# Patient Record
Sex: FEMALE | Race: ASIAN
[De-identification: names, ages, dates, MRNs, and addresses within clinical notes are randomized per-mention and may not be internally consistent; named-entity substitution may affect disease eponyms.]

---

## 2017-07-29 ENCOUNTER — HOSPITAL ENCOUNTER (EMERGENCY)
Dept: HOSPITAL 45 - C.EDB | Age: 22
Discharge: HOME | End: 2017-07-29
Payer: SELF-PAY

## 2017-07-29 VITALS
BODY MASS INDEX: 19.38 KG/M2 | WEIGHT: 109.35 LBS | HEIGHT: 62.99 IN | WEIGHT: 109.35 LBS | HEIGHT: 62.99 IN | BODY MASS INDEX: 19.38 KG/M2

## 2017-07-29 VITALS — OXYGEN SATURATION: 98 % | SYSTOLIC BLOOD PRESSURE: 106 MMHG | DIASTOLIC BLOOD PRESSURE: 70 MMHG | HEART RATE: 65 BPM

## 2017-07-29 VITALS — TEMPERATURE: 98.42 F

## 2017-07-29 DIAGNOSIS — O20.0: Primary | ICD-10-CM

## 2017-07-29 DIAGNOSIS — Z3A.00: ICD-10-CM

## 2017-07-29 DIAGNOSIS — O23.41: ICD-10-CM

## 2017-07-29 DIAGNOSIS — R10.2: ICD-10-CM

## 2017-07-29 LAB
ALBUMIN/GLOB SERPL: 1.1 {RATIO} (ref 0.9–2)
ALP SERPL-CCNC: 56 U/L (ref 45–117)
ALT SERPL-CCNC: 19 U/L (ref 12–78)
ANION GAP SERPL CALC-SCNC: 6 MMOL/L (ref 3–11)
APPEARANCE UR: CLEAR
AST SERPL-CCNC: 6 U/L (ref 15–37)
BASOPHILS # BLD: 0.02 K/UL (ref 0–0.2)
BASOPHILS NFR BLD: 0.2 %
BILIRUB UR-MCNC: (no result) MG/DL
BUN SERPL-MCNC: 10 MG/DL (ref 7–18)
BUN/CREAT SERPL: 18.1 (ref 10–20)
CALCIUM SERPL-MCNC: 8.5 MG/DL (ref 8.5–10.1)
CHLORIDE SERPL-SCNC: 107 MMOL/L (ref 98–107)
CO2 SERPL-SCNC: 25 MMOL/L (ref 21–32)
COLOR UR: YELLOW
COMPLETE: YES
CREAT CL PREDICTED SERPL C-G-VRATE: 123.5 ML/MIN
CREAT SERPL-MCNC: 0.55 MG/DL (ref 0.6–1.2)
EOSINOPHIL NFR BLD AUTO: 347 K/UL (ref 130–400)
GLOBULIN SER-MCNC: 3.5 GM/DL (ref 2.5–4)
GLUCOSE SERPL-MCNC: 65 MG/DL (ref 70–99)
HCT VFR BLD CALC: 37.6 % (ref 37–47)
IG%: 0.1 %
IMM GRANULOCYTES NFR BLD AUTO: 28.2 %
INR PPP: 1 (ref 0.9–1.1)
LYMPHOCYTES # BLD: 2.59 K/UL (ref 1.2–3.4)
MANUAL MICROSCOPIC REQUIRED?: NO
MCH RBC QN AUTO: 28.5 PG (ref 25–34)
MCHC RBC AUTO-ENTMCNC: 33.2 G/DL (ref 32–36)
MCV RBC AUTO: 85.8 FL (ref 80–100)
MONOCYTES NFR BLD: 9.4 %
NEUTROPHILS # BLD AUTO: 1.5 %
NEUTROPHILS NFR BLD AUTO: 60.6 %
NITRITE UR QL STRIP: (no result)
PARTIAL THROMBOPLASTIN RATIO: 1
PH UR STRIP: 6 [PH] (ref 4.5–7.5)
PMV BLD AUTO: 9.1 FL (ref 7.4–10.4)
POTASSIUM SERPL-SCNC: 3.4 MMOL/L (ref 3.5–5.1)
PROTHROMBIN TIME: 10.4 SECONDS (ref 9–12)
RBC # BLD AUTO: 4.38 M/UL (ref 4.2–5.4)
REVIEW REQ?: YES
SODIUM SERPL-SCNC: 138 MMOL/L (ref 136–145)
SP GR UR STRIP: 1.03 (ref 1–1.03)
URINE BILL WITH OR WITHOUT MIC: (no result)
URINE EPITHELIAL CELL AUTO: >30 /LPF (ref 0–5)
UROBILINOGEN UR-MCNC: (no result) MG/DL
WBC # BLD AUTO: 9.19 K/UL (ref 4.8–10.8)

## 2017-07-29 NOTE — DIAGNOSTIC IMAGING REPORT
APPENDIX ULTRASOUND



HISTORY:     Right lower quadrant pain. Pregnancy.



COMPARISON: None.



FINDINGS: 

Transabdominal scanning of the right lower quadrant was performed. The appendix

was not identified. A small amount of fluid was noted within the right lower

quadrant.



IMPRESSION:  







1. Nonvisualization of the appendix. This study is nondiagnostic in regards to

evaluation for acute appendicitis.



2. Small amount of fluid within the right lower quadrant.







Electronically signed by:  Edgar Segura M.D.

7/29/2017 7:25 PM



Dictated Date/Time:  7/29/2017 7:25 PM

## 2017-07-29 NOTE — DIAGNOSTIC IMAGING REPORT
FIRST TRIMESTER FETAL ULTRASOUND



CLINICAL HISTORY: Pregnancy. Pain.    



COMPARISON STUDY:  No previous studies for comparison.



TECHNIQUE: Transabdominal sonography of the pelvis was performed. The patient

deferred transvaginal imaging.



FINDINGS: Evaluation is suboptimal given the lack of transvaginal imaging. The

uterus measures 10.6 x 6.3 x 6.8 cm. The endometrium is thickened and

heterogeneous, measuring 3.5 cm in thickness. Note was made of a cystic

structure within the upper aspect of the endometrium with a mean diameter of

0.51 cm which likely reflects a gestational sac. No fetal pole or yolk sac was

identified. An adjacent 0.81 cm hypoechoic focus may reflect a tiny subchorionic

hematoma. The right ovary was normal, measuring 3.1 x 1 x 2.7 cm and the left

ovary measured 4.2 x 3.7 x 2.9 cm. There is a 2.2 cm complex left ovarian lesion

which may reflect a corpus luteal cyst. Color flow is identified within each

ovary.



IMPRESSION:  



1.  Probable intrauterine gestational sac identified with a mean sac diameter of

0.51 cm. No fetal pole identified at this time. Suspected tiny subchorionic

hematoma. Thickened, heterogeneous endometrium. The findings may reflect a

normal early intrauterine gestation. Clinical follow-up, including serial beta

hCG levels, is recommended.



2. Suboptimal evaluation given the lack of transvaginal imaging.



3. Corpus luteal/hemorrhagic cyst within the left ovary.









Electronically signed by:  Edgar Segura M.D.

7/29/2017 7:24 PM



Dictated Date/Time:  7/29/2017 7:19 PM

## 2017-07-29 NOTE — EMERGENCY ROOM VISIT NOTE
History


Report prepared by Gloria:  Oxana Beltran


Under the Supervision of:  Dr. Yang Johnson D.O.


First contact with patient:  18:02


Chief Complaint:  ABDOMINAL PAIN


Stated Complaint:  PAIN UNDER BELLY BUTTON


Nursing Triage Summary:  


Abd pain.  Pt states she had a positive pregnancy test.  Pain was there in the 


morning, none now.





History of Present Illness


The patient is a 22 year old female who presents to the Emergency Room with 

complaints of intermittent abdominal pain for the past 4 days. The pain is 

present under her belly button. She had a positive pregnancy test 2 days ago. 

She has been pregnant 1 time before and had a full term baby. She did not have 

this type of pain with her first pregnancy. She states that the pain feels like 

her premenstrual cramps. Her last period was 1 month ago. She denies having any 

pain currently. She has had some dizziness and lightheadedness when she stands 

up sometimes. She denies any urinary symptoms, vaginal bleeding, back pain, or 

fever. She denies any previous medical problems or surgeries. She denies any 

tobacco or alcohol use.





   Source of History:  patient


   Onset:  4 days


   Position:  abdomen


   Quality:  cramping


   Timing:  intermittent


   Associated Symptoms:  No fevers, No back pain, No urinary symptoms


Note:


Pt has lightheadedness and dizziness when standing. Pt denies vaginal bleeding.





Review of Systems


See HPI for pertinent positives & negatives. A total of 10 systems reviewed and 

were otherwise negative.





Past Medical & Surgical


Surgical Problems:


(1) Hx of LASIK








Family History


No pertinent family history stated.





Social History


Smoking Status:  Never Smoker


Housing Status:  lives with family





Current/Historical Medications


Scheduled


Cephalexin Monohydrate (Keflex), 500 MG PO QID


Multivitamin (Multivitamin), 1 TAB PO DAILY





Allergies


Coded Allergies:  


     No Known Allergies (Unverified , 7/29/17)





Physical Exam


Vital Signs











  Date Time  Temp Pulse Resp B/P (MAP) Pulse Ox O2 Delivery O2 Flow Rate FiO2


 


7/29/17 21:02  65 18 106/70 98   


 


7/29/17 19:34  66 16 102/56 98 Room Air  


 


7/29/17 17:34 36.9 102 18 107/69 100 Room Air  











Physical Exam


GENERAL:  Patient is awake, alert, and in no acute distress. Patient is resting 

comfortably and showing no signs of anxiety


EYES: The conjunctivae are clear.  The pupils are round and reactive. 


EARS, NOSE, MOUTH AND THROAT: The nose is without any evidence of any 

deformity. Mucous membranes are moist tongue is midline 


NECK: The neck is nontender and supple.


RESPIRATORY: Normal respiratory effort is noted there is no evidence of 

wheezing rhonchi or rales


CARDIOVASCULAR:  Regular rate and rhythm noted there no murmurs rubs or gallops 

normal S1 normal S2 


GASTROINTESTINAL: The abdomen is soft with RLQ and suprapubic tenderness to 

palpation, no guarding or rigidity. Bedside US failed to reveal definite 

intrauterine pregnancy.


MUSCULOSKELETAL/EXTREMITIES: There is no evidence of gross deformity full range 

of motion is noted in the hips and shoulders


SKIN: There is no obvious evidence of any rash. There are no petechiae, pallor 

or cyanosis noted. 


NEUROLOGIC:  Patient is awake alert and oriented x3





Medical Decision & Procedures


ER Provider


Diagnostic Interpretation:


Radiology results as stated below per my review and radiologist interpretation:





APPENDIX ULTRASOUND





HISTORY:     Right lower quadrant pain. Pregnancy.





COMPARISON: None.





FINDINGS: 


Transabdominal scanning of the right lower quadrant was performed. The appendix


was not identified. A small amount of fluid was noted within the right lower


quadrant.





IMPRESSION:  





1. Nonvisualization of the appendix. This study is nondiagnostic in regards to


evaluation for acute appendicitis.





2. Small amount of fluid within the right lower quadrant.





Electronically signed by:  Edgar Segura M.D.


7/29/2017 7:25 PM





Dictated Date/Time:  7/29/2017 7:25 PM








FIRST TRIMESTER FETAL ULTRASOUND





CLINICAL HISTORY: Pregnancy. Pain.    





COMPARISON STUDY:  No previous studies for comparison.





TECHNIQUE: Transabdominal sonography of the pelvis was performed. The patient


deferred transvaginal imaging.





FINDINGS: Evaluation is suboptimal given the lack of transvaginal imaging. The


uterus measures 10.6 x 6.3 x 6.8 cm. The endometrium is thickened and


heterogeneous, measuring 3.5 cm in thickness. Note was made of a cystic


structure within the upper aspect of the endometrium with a mean diameter of


0.51 cm which likely reflects a gestational sac. No fetal pole or yolk sac was


identified. An adjacent 0.81 cm hypoechoic focus may reflect a tiny subchorionic


hematoma. The right ovary was normal, measuring 3.1 x 1 x 2.7 cm and the left


ovary measured 4.2 x 3.7 x 2.9 cm. There is a 2.2 cm complex left ovarian lesion


which may reflect a corpus luteal cyst. Color flow is identified within each


ovary.





IMPRESSION:  





1.  Probable intrauterine gestational sac identified with a mean sac diameter of


0.51 cm. No fetal pole identified at this time. Suspected tiny subchorionic


hematoma. Thickened, heterogeneous endometrium. The findings may reflect a


normal early intrauterine gestation. Clinical follow-up, including serial beta


hCG levels, is recommended.





2. Suboptimal evaluation given the lack of transvaginal imaging.





3. Corpus luteal/hemorrhagic cyst within the left ovary.





Electronically signed by:  Edgar Segura M.D.


7/29/2017 7:24 PM





Dictated Date/Time:  7/29/2017 7:19 PM





Laboratory Results


7/29/17 18:20








Red Blood Count 4.38, Mean Corpuscular Volume 85.8, Mean Corpuscular Hemoglobin 

28.5, Mean Corpuscular Hemoglobin Concent 33.2, Mean Platelet Volume 9.1, 

Neutrophils (%) (Auto) 60.6, Lymphocytes (%) (Auto) 28.2, Monocytes (%) (Auto) 

9.4, Eosinophils (%) (Auto) 1.5, Basophils (%) (Auto) 0.2, Neutrophils # (Auto) 

5.57, Lymphocytes # (Auto) 2.59, Monocytes # (Auto) 0.86, Eosinophils # (Auto) 

0.14, Basophils # (Auto) 0.02





7/29/17 18:20

















Test


  7/29/17


18:20


 


White Blood Count


  9.19 K/uL


(4.8-10.8)


 


Red Blood Count


  4.38 M/uL


(4.2-5.4)


 


Hemoglobin


  12.5 g/dL


(12.0-16.0)


 


Hematocrit 37.6 % (37-47) 


 


Mean Corpuscular Volume


  85.8 fL


()


 


Mean Corpuscular Hemoglobin


  28.5 pg


(25-34)


 


Mean Corpuscular Hemoglobin


Concent 33.2 g/dl


(32-36)


 


Platelet Count


  347 K/uL


(130-400)


 


Mean Platelet Volume


  9.1 fL


(7.4-10.4)


 


Neutrophils (%) (Auto) 60.6 % 


 


Lymphocytes (%) (Auto) 28.2 % 


 


Monocytes (%) (Auto) 9.4 % 


 


Eosinophils (%) (Auto) 1.5 % 


 


Basophils (%) (Auto) 0.2 % 


 


Neutrophils # (Auto)


  5.57 K/uL


(1.4-6.5)


 


Lymphocytes # (Auto)


  2.59 K/uL


(1.2-3.4)


 


Monocytes # (Auto)


  0.86 K/uL


(0.11-0.59)


 


Eosinophils # (Auto)


  0.14 K/uL


(0-0.5)


 


Basophils # (Auto)


  0.02 K/uL


(0-0.2)


 


RDW Standard Deviation


  42.9 fL


(36.4-46.3)


 


RDW Coefficient of Variation


  13.6 %


(11.5-14.5)


 


Immature Granulocyte % (Auto) 0.1 % 


 


Immature Granulocyte # (Auto)


  0.01 K/uL


(0.00-0.02)


 


Prothrombin Time


  10.4 SECONDS


(9.0-12.0)


 


Prothromb Time International


Ratio 1.0 (0.9-1.1) 


 


 


Activated Partial


Thromboplast Time 26.3 SECONDS


(21.0-31.0)


 


Partial Thromboplastin Ratio 1.0 


 


Urine Color YELLOW 


 


Urine Appearance CLEAR (CLEAR) 


 


Urine pH 6.0 (4.5-7.5) 


 


Urine Specific Gravity


  1.026


(1.000-1.030)


 


Urine Protein NEG (NEG) 


 


Urine Glucose (UA) NEG (NEG) 


 


Urine Ketones NEG (NEG) 


 


Urine Occult Blood NEG (NEG) 


 


Urine Nitrite NEG (NEG) 


 


Urine Bilirubin NEG (NEG) 


 


Urine Urobilinogen NEG (NEG) 


 


Urine Leukocyte Esterase SMALL (NEG) 


 


Urine WBC (Auto) >30 /hpf (0-5) 


 


Urine RBC (Auto) 0-4 /hpf (0-4) 


 


Urine Hyaline Casts (Auto)


  5-10 /lpf


(0-5)


 


Urine Epithelial Cells (Auto) >30 /lpf (0-5) 


 


Urine Bacteria (Auto) 1+ (NEG) 


 


Urine Pathogenic Casts  /lpf (0) 


 


Anion Gap


  6.0 mmol/L


(3-11)


 


Est Creatinine Clear Calc


Drug Dose 123.5 ml/min 


 


 


Estimated GFR (


American) > 150.0 


 


 


Estimated GFR (Non-


American 131.3 


 


 


BUN/Creatinine Ratio 18.1 (10-20) 


 


Calcium Level


  8.5 mg/dl


(8.5-10.1)


 


Total Bilirubin


  0.2 mg/dl


(0.2-1)


 


Aspartate Amino Transf


(AST/SGOT) 6 U/L (15-37) 


 


 


Alanine Aminotransferase


(ALT/SGPT) 19 U/L (12-78) 


 


 


Alkaline Phosphatase


  56 U/L


()


 


Total Protein


  7.2 gm/dl


(6.4-8.2)


 


Albumin


  3.7 gm/dl


(3.4-5.0)


 


Globulin


  3.5 gm/dl


(2.5-4.0)


 


Albumin/Globulin Ratio 1.1 (0.9-2) 


 


Human Chorionic Gonadotropin,


Quant 3247 mIU/mL 


 





Laboratory results per my review.





Medications Administered











 Medications


  (Trade)  Dose


 Ordered  Sig/Amber


 Route  Start Time


 Stop Time Status Last Admin


Dose Admin


 


 Sodium Chloride  1,000 ml @ 


 999 mls/hr  Q1H1M STAT


 IV  7/29/17 18:13


 7/29/17 19:13 DC 7/29/17 18:13


999 MLS/HR


 


 Ceftriaxone Sodium


  (Rocephin Inj)  1 gm  NOW  STAT


 IV  7/29/17 20:03


 7/29/17 20:04 DC 7/29/17 20:17


1 GM











ED Course


1806: The patient was evaluated in room A11B. A complete history and physical 

examination were performed. 





1813: NSS 1000 ml @ 999 mls/hr IV.





2002: Upon reevaluation, the patient is resting comfortably. I discussed the 

results and treatment plan with her. She verbalized agreement of the treatment 

plan. She was discharged home. 





2003: Rocephin Inj 1 gm IV. 





2008: I discussed the patient's case with Dr. Hunter, [facility]. She 

recommends follow up in 48 hours and return for worsening symptoms.





Medical Decision


Prior records/ancillary studies reviewed.


Triage Nursing notes reviewed.


The patient's history was concerning for abdominal pain. 





Differential diagnosis:


Etiologies such as appendicitis, diverticulitis, PUD, biliary pathology, UTI, 

pancreatitis, obstruction, mesenteric ischemia, aortic pathology, infections, 

inflammatory bowel disease, renal colic, as well as others were entertained. 





The patient is a 22-year-old female who presented to the emergency department 

for evaluation of pelvic pain. The patient briefly found out she was pregnant 

by a urinary pregnancy test. The patient also had right-sided abdominal pain 

but her physical exam was not consistent with an acute surgical abdomen and her 

white blood cell count was not elevated. I discussed the patient's laboratory 

and radiographic studies with her. I discussed her case with the on-call OB/GYN 

physician. At this time I recommended 48 hour follow-up for repeat ultrasound 

as well as beta hCG. I also recommended that she return to the emergency 

Department immediately if any signs that would be consistent with ectopic 

pregnancy develop such as worsening pain syncope dizziness or if need arises. 

The patient was also evaluated by the emergency department .





Medication Reconcilliation


Current Medication List:  was personally reviewed by me





Blood Pressure Screening


Patient's blood pressure:  Normal blood pressure


Blood pressure disposition:  Did not require urgent referral





Consults


Time Called:  2003


Consulting Physician:  Dr. Hunter, [facility]


Returned Call:  2008


I discussed the patient's case with her. She recommends follow up in 48 hours 

and return for worsening symptoms.





Impression





 Primary Impression:  


 Pelvic pain


 Additional Impressions:  


 Pregnancy


 UTI (urinary tract infection)


 Threatened miscarriage in early pregnancy





Scribe Attestation


The scribe's documentation has been prepared under my direction and personally 

reviewed by me in its entirety. I confirm that the note above accurately 

reflects all work, treatment, procedures, and medical decision making performed 

by me.





Departure Information


Dispostion


Home / Self-Care





Prescriptions





Cephalexin Monohydrate (KEFLEX) 500 Mg Cap


500 MG PO QID, #28 CAP


   Prov: Yang Johsnon, DO         7/29/17





Referrals


No Doctor, Assigned (PCP)








Dorinda Hunter MD





Forms


HOME CARE DOCUMENTATION FORM,                                                 

               IMPORTANT VISIT INFORMATION





Patient Instructions


ED Abdominal Pain Rule Out Ectopic, My [facility], Urinary Tract 

Infecs Women





Additional Instructions





Follow-up with the OB/GYN physician on Monday for reevaluation. You will 

require a repeat ultrasound as well as repeat blood test to further evaluate 

the cause of your pain. Rest and avoid any strenuous activity. Start taking the 

antibiotic tomorrow for the urinary tract infection. Return to the emergency 

department immediately if symptoms change worsen or the need arises.





Problem Qualifiers

## 2017-11-21 ENCOUNTER — HOSPITAL ENCOUNTER (OUTPATIENT)
Dept: HOSPITAL 45 - C.LABSPEC | Age: 22
Discharge: HOME | End: 2017-11-21
Attending: OBSTETRICS & GYNECOLOGY
Payer: COMMERCIAL

## 2017-11-21 DIAGNOSIS — J06.0: ICD-10-CM

## 2017-11-21 DIAGNOSIS — Z34.82: Primary | ICD-10-CM

## 2018-01-18 ENCOUNTER — HOSPITAL ENCOUNTER (OUTPATIENT)
Dept: HOSPITAL 45 - C.LAB1850 | Age: 23
Discharge: HOME | End: 2018-01-18
Attending: OBSTETRICS & GYNECOLOGY
Payer: COMMERCIAL

## 2018-01-18 DIAGNOSIS — Z34.83: Primary | ICD-10-CM

## 2018-01-18 LAB
HCT VFR BLD CALC: 33.4 % (ref 37–47)
HGB BLD-MCNC: 11.2 G/DL (ref 12–16)

## 2018-03-08 ENCOUNTER — HOSPITAL ENCOUNTER (OUTPATIENT)
Dept: HOSPITAL 45 - C.LABSPEC | Age: 23
Discharge: HOME | End: 2018-03-08
Attending: OBSTETRICS & GYNECOLOGY
Payer: COMMERCIAL

## 2018-03-08 DIAGNOSIS — Z34.83: Primary | ICD-10-CM

## 2018-03-30 ENCOUNTER — HOSPITAL ENCOUNTER (INPATIENT)
Dept: HOSPITAL 45 - C.LD | Age: 23
LOS: 2 days | Discharge: HOME | End: 2018-04-01
Attending: OBSTETRICS & GYNECOLOGY | Admitting: OBSTETRICS & GYNECOLOGY
Payer: COMMERCIAL

## 2018-03-30 VITALS
WEIGHT: 148 LBS | HEIGHT: 60.98 IN | HEIGHT: 60.98 IN | BODY MASS INDEX: 27.94 KG/M2 | BODY MASS INDEX: 27.94 KG/M2 | WEIGHT: 148 LBS

## 2018-03-30 VITALS
DIASTOLIC BLOOD PRESSURE: 67 MMHG | OXYGEN SATURATION: 97 % | HEART RATE: 100 BPM | TEMPERATURE: 98.42 F | SYSTOLIC BLOOD PRESSURE: 105 MMHG

## 2018-03-30 DIAGNOSIS — Z3A.39: ICD-10-CM

## 2018-03-30 LAB
EOSINOPHIL NFR BLD AUTO: 326 K/UL (ref 130–400)
HCT VFR BLD CALC: 34.1 % (ref 37–47)
HGB BLD-MCNC: 11.4 G/DL (ref 12–16)
MCH RBC QN AUTO: 28.1 PG (ref 25–34)
MCHC RBC AUTO-ENTMCNC: 33.4 G/DL (ref 32–36)
MCV RBC AUTO: 84 FL (ref 80–100)
PMV BLD AUTO: 9 FL (ref 7.4–10.4)
RED CELL DISTRIBUTION WIDTH CV: 14.5 % (ref 11.5–14.5)
RED CELL DISTRIBUTION WIDTH SD: 44.7 FL (ref 36.4–46.3)
WBC # BLD AUTO: 9.34 K/UL (ref 4.8–10.8)

## 2018-03-30 PROCEDURE — 0KQM0ZZ REPAIR PERINEUM MUSCLE, OPEN APPROACH: ICD-10-PCS | Performed by: OBSTETRICS & GYNECOLOGY

## 2018-03-30 NOTE — ANESTHESIA PROCEDURE NOTE
Anesthesia Epidural Removal Nt


Date & Time


Mar 30, 2018 at 21:43





Notes


Mental Status:  alert / awake / arousable, participated in evaluation


Nausea / Vomiting:  adequately controlled


Pain:  adequately controlled


Airway Patency, RR, SpO2:  stable & adequate


BP & HR:  stable & adequate


Hydration State:  stable & adequate


Neuraxial Anesthesia:  was administered


Anesthetic Complications:  no major complications apparent, pt satisfied with 

anesthetic care


Epidural:  removed without complications, with tip intact

## 2018-03-30 NOTE — DELIVERY SUMMARY
DATE OF OPERATION:  2018

 

FINDINGS:  Viable male infant with Apgars of 8 and 9.  Baby delivered

precipitously over a midline second degree laceration.  True knot in cord

noted.  Cord blood samples obtained.  Placenta delivered spontaneously. 

Midline second degree laceration repaired with 4-0 Vicryl in a routine

fashion.  Estimated blood loss 300 mL.

 

LABOR NOTE:  The patient is a 23-year-old  2, para 1 with an EDC of

2018, 39+ weeks gestational age presented to labor and delivery with

spontaneous rupture of membranes.  The patient states the membrane ruptured

at approximately 1200 hours on day of delivery.  She described the fluid as

clear with no contractions or bleeding.  The patient has had a benign

prenatal course.  Her blood type A positive and antibody negative, rubella

immune, hepatitis B negative.  She had negative 2-hour glucose tolerance test

and a negative third trimester beta strep culture.  Prior to admission, the

patient was 1 cm dilated, 50% effaced, -2 station with gross rupture. 

Tracing was category 1.  Because of the ruptured membranes at term, Pitocin

was initiated per induction protocol.  Pitocin was increased to get an

adequate labor pattern.  The patient was about 3-4 cm at which point she

became uncomfortable, anesthesia was consulted and an epidural was placed. 

Shortly after that, the patient pushed uncontrollably in bed, delivering a

viable male infant.  Cord was clamped and cut.  True knot in the cord was

noted.  Cord blood sample was noted.  Placenta was delivered spontaneously. 

A midline second degree laceration was repaired with 4-0 Vicryl in a routine

fashion.  Estimated blood loss 300 mL.  Sponge and needle count was correct.

 

 

I attest to the content of the Intraoperative Record and any orders documented therein. Any exception
s are noted below.

## 2018-03-31 VITALS
TEMPERATURE: 98.96 F | DIASTOLIC BLOOD PRESSURE: 56 MMHG | SYSTOLIC BLOOD PRESSURE: 94 MMHG | HEART RATE: 92 BPM | OXYGEN SATURATION: 97 %

## 2018-03-31 VITALS
DIASTOLIC BLOOD PRESSURE: 72 MMHG | SYSTOLIC BLOOD PRESSURE: 108 MMHG | OXYGEN SATURATION: 98 % | TEMPERATURE: 98.06 F | HEART RATE: 80 BPM

## 2018-03-31 VITALS — HEART RATE: 81 BPM | TEMPERATURE: 98.24 F | SYSTOLIC BLOOD PRESSURE: 109 MMHG | DIASTOLIC BLOOD PRESSURE: 72 MMHG

## 2018-03-31 VITALS — DIASTOLIC BLOOD PRESSURE: 66 MMHG | TEMPERATURE: 98.42 F | SYSTOLIC BLOOD PRESSURE: 103 MMHG | HEART RATE: 92 BPM

## 2018-03-31 VITALS — SYSTOLIC BLOOD PRESSURE: 105 MMHG | HEART RATE: 88 BPM | DIASTOLIC BLOOD PRESSURE: 73 MMHG | TEMPERATURE: 98.24 F

## 2018-03-31 LAB
HCT VFR BLD CALC: 33.9 % (ref 37–47)
HGB BLD-MCNC: 11.3 G/DL (ref 12–16)

## 2018-03-31 RX ADMIN — Medication SCH TAB: at 08:52

## 2018-03-31 RX ADMIN — DOCUSATE SODIUM SCH MG: 100 CAPSULE, LIQUID FILLED ORAL at 20:38

## 2018-03-31 RX ADMIN — FERROUS SULFATE TAB EC 325 MG (65 MG FE EQUIVALENT) SCH MG: 325 (65 FE) TABLET DELAYED RESPONSE at 08:51

## 2018-03-31 RX ADMIN — DOCUSATE SODIUM SCH MG: 100 CAPSULE, LIQUID FILLED ORAL at 08:52

## 2018-03-31 NOTE — DISCHARGE INSTRUCTIONS
Discharge Instructions


Date of Service


Mar 31, 2018.





Admission


Reason for Admission:  IUP





Discharge


Discharge Diagnosis / Problem:  normal labor & delivery





Discharge Goals


Goal(s):  Routine recovery after delivery





Medications


Continue Dispensed Medications:  supercream, dermaplast, tucks, lansinoh





Activity Recommendations


Activity Limitations:  per Instructions/Follow-up section





.





Instructions / Follow-Up


Instructions / Follow-Up





ACTIVITY RECOMMENDATIONS:





* Gradual return to full activity over the next 2-3 weeks.


* No lifting - nothing heavier than baby over the next 2-3 weeks.


* Do not engage in vigorous exercise, sexual activity or sports until cleared by


   your physician.


* Do not drive or operate any motorized equipment until cleared by your 

physician.


* You may shower/bathe daily.








MEDICATIONS:





For discomfort or pain, you may use Acetaminophen (Tylenol), Ibuprofen (Advil),


or Naproxen (Aleve) following the package directions. For constipation you may 


use Colace following the package directions.








BREAST CARE:





If you are not breast feeding:





*  Wear a supportive bra 24 hours a day for one to two weeks.


*  Avoid stimulating your breasts and nipples as much as possible during the 

first 


    few weeks after delivery.


*  When taking a shower, have the warm water hit your back, not breasts.


*  When your breasts feel full, apply ice packs.  Usually three to four times a 

day


    helps ease the discomfort.


*  Take a mild pain medication (Tylenol / Motrin) when you are uncomfortable.





If breast feeding:





*  Use breast milk to lubricate nipples.  Lansinoh cream may be used for sore 

nipples. 


    You do not need to remove cream prior to breast feeding.  If using a 

different brand


   of cream, check the label for directions regarding removal of cream prior to 

nursing.


*  Wear a supportive bra.


*  If having problems with breasts or breast feeding, call a lactation 

consultant 


    or your health care provider.








EPISIOTOMY CARE:





After delivery, if you have an episiotomy (stitches), the following steps will 

ease


discomfort and aid healing.





*  For the first 24 hours after delivery, place ice packs next to your 

episiotomy to


   help reduce swelling.


*  After the first 24 hour-period, sitz baths, either portable or in the tub, 

are suggested.


    A shower with a shower arm sprayed over the episiotomy may be comforting.


*  Meron care should be done after each voiding and bowel movement.  Squirt warm 

water


    from a plastic bottle over the perineum (region of the body between the 

anus and 


    urinary opening) and pat dry.


*  Use Dermoplast to ease discomfort.  Shake container.  Spray directly over 

the 


    episiotomy.  Place a Tucks on a clean sanitary pad next to your episiotomy.








SPECIAL CARE INSTRUCTIONS:





When you are discharged from the hospital, it is important for you to follow 

the instructions 


listed below:





*  During the first week at home, you should be able to care for yourself and 

your baby.


    In addition, the usual light household activities are encouraged.





*  Limit your activities to the way you feel.  Do not try to clean the house or 

move 


    furniture. Be sensible.





*  If you actively engage in sports and have done so up until the time of your 

delivery, 


    you may resume these activities as soon as you feel able.  This may take up 

to one 


    month or even longer.  Use good judgment.





*  Continue to take your prenatal vitamins for at least six weeks after the 

birth of your baby.





*  Your diet need not be limited unless you were on a special diet before your 

delivery.  


    Breast-feeding mothers need around 2500 calories per day and at least 64-80 

ounces of 


    fluid per day (8 to 10 glasses).





*  You should eat foods from the four major food groups.  Crash diets or fad 

diets are to be 


    avoided.  Eating lean meats, fresh fruits and vegetables, low-fat dairy 

products, high fiber


    foods and a regular exercise program, will help you get back to your pre-

pregnancy weight


    without putting your health at risk.





*  Constipation is sometimes a problem after delivery.  Take a mild laxative as 

needed.  If 


    breast feeding, Milk of Magnesia is acceptable to use. You may use a 

suppository or Fleets


    enema if no episiotomy.





*  A daily shower or tub bath is suggested.  Be sure to thoroughly and gently 

dry the perineum.





*  A bloody vaginal discharge will usually continue until around four weeks 

post partum.  A 


    small amount of bleeding may continue for as long as six weeks.  Vaginal 

discharge changes


    from the bright red bleeding after delivery to pink then brownish and 

finally yellowish-pink 


    before becoming white and disappearing.





*  Bleeding may increase with activity.  Your first period may come in 4-8 

weeks.  If you are 


    breast feeding, your period may be delayed even longer.





*  [place] (sex) can begin whenever both you and your partner feel 

comfortable and do 


    not have any form of genital infection.  It is recommended that you wait at 

least six weeks


    for internal and external healing to occur.  If you have questions, please 

talk to your health


    care practitioner.  A condom should be used to prevent infection and 

pregnancy.





*  Foreplay, gentle intercourse and lubrication is very important the first 

several times to 


    prevent pain.  A water-based lubricant such as K-Y jelly or Astroglide may 

be used.





*  If you have RH negative blood and your baby is RH positive, you will receive 

RHOGAM by 


    injection prior to discharge.  The nurse will give you a card to keep with 

you that has the


    date and place that you received RHOGAM after delivery.





*  During your prenatal care, you had a Rubella screen done to check for the 

presence of 


    rubella antibodies in your blood.  If your test was negative, you will 

receive a Rubella 


    vaccine prior to discharge.  This vaccine may cause a fever, soreness at 

the injection site


    and flu-like symptoms.  If these symptoms persist, notify your health care 

practitioner.  


    Pregnancy is not advised for one month after a Rubella vaccine.





*  Verbalizes understanding of car seat law as reviewed with patient nursing.





*  Car Seat hand-out given and reviewed with patient by nursing.





*  Shaken baby information reviewed with patient by nursing.


 


Call you doctor if:





*  Heavy bleeding (saturating several pads an hour) or passing clots the size 

of your fist.


*  A fever >101 degrees F (38.3 degrees C) on two occasions four hours apart and

/or chills.


*  Unusual pain in the pelvic or vaginal areas.


* "Baby Blues" lasting longer than two weeks.





If you have any questions or concerns, call your health care practitioner at 


[phone].








FOLLOW UP VISIT:





*  Please call the office at [phone] to schedule a 6 week postpartum 


    examination.  It is important you keep this appointment.  It is important 


    for you to make arrangements for either yearly or twice yearly check-ups 


    thereafter.





Current Hospital Diet


Patient's current hospital diet: Vegetarian Diet





Discharge Diet


Recommended Diet:  Regular OB Diet





Pending Studies


Studies pending at discharge:  no





Medical Emergencies








.


Who to Call and When:





Medical Emergencies:  If at any time you feel your situation is an emergency, 

please call [phone] immediately.





.





Non-Emergent Contact


Non-Emergency issues call your:  Gynecologist





.


.








"Provider Documentation" section prepared by Kristie Armstrong








.

## 2018-03-31 NOTE — PROGRESS NOTE
Subjective


Mar 31, 2018.


Subjective


conversation w/ patient, physical exam


Ambulation:  ambulating normally


Voiding:  no voiding problems


Passing Gas:  No


Diet Tolerance:  Regular Diet


Lochia:  Small


Feeding Type:  Breast Feeding


Pain:  minimal, cramping pain, especially with breast feeding


Comment:


Seen and assessed at bedside, no acute events overnight





Review of Systems


Constitutional:  No fever, No chills


Respiratory:  No cough, No shortness of breath


Cardiac:  No chest pain, No edema


Abdomen:  No pain, No nausea, No vomiting


no headache or calf pain





Objective


Vital Signs











  Date Time  Temp Pulse Resp B/P (MAP) Pulse Ox O2 Delivery O2 Flow Rate FiO2


 


3/31/18 04:10 37.2 92 16 94/56 (69) 97 Room Air  


 


3/30/18 23:50 36.9 100 18 105/67 (80) 97 Room Air  


 


3/30/18 23:50      Room Air  











Physical Exam


General Appearance:  WELL-APPEARING, NO APPARENT DISTRESS


Respiratory/Chest:  chest non-tender, lungs clear, normal breath sounds


Cardiovascular:  regular rate, rhythm, no edema, no murmur


Abdomen:  normal bowel sounds, non tender, soft


Fundus:  Firm, Non-Tender, Relation to Umbilicus (at to 1 below)


Extremities:  normal range of motion, non-tender, normal inspection, no pedal 

edema, no calf tenderness





Laboratory Results





Last 24 Hours








Test


  3/30/18


13:17 3/31/18


06:18


 


White Blood Count 9.34 K/uL  


 


Red Blood Count 4.06 M/uL  


 


Hemoglobin 11.4 g/dL  11.3 g/dL 


 


Hematocrit 34.1 %  33.9 % 


 


Mean Corpuscular Volume 84.0 fL  


 


Mean Corpuscular Hemoglobin 28.1 pg  


 


Mean Corpuscular Hemoglobin


Concent 33.4 g/dl 


  


 


 


RDW Standard Deviation 44.7 fL  


 


RDW Coefficient of Variation 14.5 %  


 


Platelet Count 326 K/uL  


 


Mean Platelet Volume 9.0 fL  











Medications





Current Inpatient Medications








 Medications


  (Trade)  Dose


 Ordered  Sig/Amber


 Route  Start Time


 Stop Time Status Last Admin


Dose Admin


 


 Lactated Ringer's  1,000 ml @ 


 125 mls/hr  Q8H


 IV  3/30/18 13:03


 18 13:02  3/30/18 13:37


125 MLS/HR


 


 Lactated Ringer's  1,000 ml @ 


 999 mls/hr  Q1H1M PRN


 IV  3/30/18 13:03


 18 13:02   


 


 


 Lactated Ringer's  500 ml @ 


 999 mls/hr  Q31M PRN


 IV  3/30/18 13:03


 18 13:02   


 


 


 Oxytocin


  (Pitocin IV)  30 units  UD  PRN


 IV  3/30/18 20:45


 18 20:44   


 


 


 Benzocaine


  (Dermoplast Aero


 Spr)  1 appln  PRN  PRN


 EXT  3/30/18 20:45


 18 20:44  3/30/18 23:50


1 APPLN


 


 Cocaine HCl


  (Supercream


 0.870% Cr)    BID  PRN


 EXT  3/30/18 20:45


 18 20:44   


 


 


 Hydrocortisone


 Acetate


  (Anusol Hc Supp)  25 mg  BID  PRN


 LA  3/30/18 20:45


 18 20:44   


 


 


 Lanolin


  (Lanolin Oint)    PRN  PRN


 EXT  3/30/18 20:45


 18 20:44   


 


 


 Prenat Multivit/


 /Iron/Folic


 Ac


  (Prenatal


 Vitamin Tab)  1 tab  DAILY


 PO  3/31/18 08:00


 18 07:59   


 


 


 Ibuprofen


  (Motrin Tab)  600 mg  Q4H  PRN


 PO  3/30/18 20:45


 18 20:44   


 


 


 Acetaminophen


  (Tylenol Tab)  650 mg  Q6H  PRN


 PO  3/30/18 20:45


 18 20:44   


 


 


 Acetaminophen/


 Codeine Phosphate


  (Tylenol w/


 Codeine #3 Tab)  1 tab  Q4H  PRN


 PO  3/30/18 20:45


 18 20:44   


 


 


 Acetaminophen/


 Codeine Phosphate


  (Tylenol w/


 Codeine #3 Tab)  2 tab  Q4H  PRN


 PO  3/30/18 20:45


 18 20:44   


 


 


 Bisacodyl


  (Dulcolax Tab)  5 mg  20


 PO  3/31/18 20:00


 3/31/18 20:01   


 


 


 Docusate Sodium


  (coLACE CAP)  100 mg  BID


 PO  3/31/18 08:00


 18 07:59   


 


 


 Ferrous Sulfate


  (Feosol Tab)  325 mg  DAILY


 PO  3/31/18 08:00


 18 07:59   


 











Assessment and Plan


Problem List


Medical Problems:


(1) Pelvic pain


Status: Acute  





(2) Pregnancy


Status: Acute  





(3) UTI (urinary tract infection)


Status: Acute  








Post-Partum


Day#:  1


Continue Routine Care:


22 yo  PPD 1 s/p 


Pt doing well 


Continue routine care


Encourage ambulation, breast feeding


Pain control with meds prn





Resident Physician Supervision Note:





I interviewed and examined the patient. Discussed with Dr. Cottrell and 

agree with findings and plan as documented in the note. Any exceptions or 

clarifications are listed here: 





Documented By:  Vicente Paniagua





Resident Tracking


Resident Involvement:  Resident Care Provided


Care Provided:  OB Delivery

## 2018-04-01 VITALS — SYSTOLIC BLOOD PRESSURE: 105 MMHG | DIASTOLIC BLOOD PRESSURE: 73 MMHG | HEART RATE: 73 BPM | TEMPERATURE: 98.06 F

## 2018-04-01 VITALS — DIASTOLIC BLOOD PRESSURE: 73 MMHG | HEART RATE: 73 BPM | TEMPERATURE: 98.06 F

## 2018-04-01 RX ADMIN — DOCUSATE SODIUM SCH MG: 100 CAPSULE, LIQUID FILLED ORAL at 08:16

## 2018-04-01 RX ADMIN — Medication SCH TAB: at 08:15

## 2018-04-01 RX ADMIN — FERROUS SULFATE TAB EC 325 MG (65 MG FE EQUIVALENT) SCH MG: 325 (65 FE) TABLET DELAYED RESPONSE at 08:15

## 2018-04-01 NOTE — PROGRESS NOTE
Subjective


Apr 1, 2018.


Subjective


conversation w/ patient


Ambulation:  ambulating normally


Voiding:  no voiding problems


Passing Gas:  Yes


Diet Tolerance:  Regular Diet


Lochia:  Small


Feeding Type:  Breast Feeding





Review of Systems


Constitutional:  No fever, No chills, No sweats, No weight loss, No weakness, 

No fatigue, No problem reported


Breast:  No see HPI, No breast lump, No change in shape, No nipple discharge, 

No breast pain, No problem reported


Abdomen:  No pain, No nausea, No vomiting, No diarrhea, No constipation, No GI 

bleeding, No problem reported


Female :  No see HPI, No dysuria, No urinary frequency, No hematuria, No 

incontinence, No abnormal vaginal bleeding, No vaginal discharge, No problem 

reported





Objective


Vital Signs











  Date Time  Temp Pulse Resp B/P (MAP) Pulse Ox O2 Delivery O2 Flow Rate FiO2


 


3/31/18 23:30     98 Room Air  


 


3/31/18 23:30 36.7 80 18 108/72 (84) 98 Room Air  


 


3/31/18 21:45 36.8 88 18 105/73 (84)  Room Air  


 


3/31/18 15:10      Room Air  


 


3/31/18 12:00 36.8 81 16 109/72 (84)  Room Air  


 


3/31/18 08:30      Room Air  


 


3/31/18 08:30 36.9 92 14 103/66 (78)  Room Air  











Physical Exam


General Appearance:  WELL-APPEARING, NO APPARENT DISTRESS


Abdomen:  non tender, soft


Fundus:  Firm, Non-Tender, Relation to Umbilicus ( 2 below U)


Extremities:  no calf tenderness





Assessment and Plan


Problem List


Medical Problems:


(1) Pelvic pain


Status: Acute  





(2) Pregnancy


Status: Acute  





(3) UTI (urinary tract infection)


Status: Acute  








Post-Partum


Day#:  2


Continue Routine Care:


stable postpartum course


discharge to home


follow up in 6 weeks.